# Patient Record
Sex: FEMALE | Race: WHITE | NOT HISPANIC OR LATINO | URBAN - METROPOLITAN AREA
[De-identification: names, ages, dates, MRNs, and addresses within clinical notes are randomized per-mention and may not be internally consistent; named-entity substitution may affect disease eponyms.]

---

## 2017-08-17 ENCOUNTER — EMERGENCY (EMERGENCY)
Facility: HOSPITAL | Age: 22
LOS: 1 days | Discharge: HOME | End: 2017-08-17
Attending: EMERGENCY MEDICINE | Admitting: EMERGENCY MEDICINE
Payer: COMMERCIAL

## 2017-08-17 VITALS
SYSTOLIC BLOOD PRESSURE: 135 MMHG | RESPIRATION RATE: 18 BRPM | HEART RATE: 77 BPM | OXYGEN SATURATION: 100 % | TEMPERATURE: 99 F | DIASTOLIC BLOOD PRESSURE: 63 MMHG

## 2017-08-17 VITALS
DIASTOLIC BLOOD PRESSURE: 66 MMHG | TEMPERATURE: 98 F | HEART RATE: 77 BPM | OXYGEN SATURATION: 97 % | RESPIRATION RATE: 16 BRPM | SYSTOLIC BLOOD PRESSURE: 119 MMHG

## 2017-08-17 PROCEDURE — 99283 EMERGENCY DEPT VISIT LOW MDM: CPT

## 2017-08-17 RX ORDER — IBUPROFEN 200 MG
600 TABLET ORAL ONCE
Qty: 0 | Refills: 0 | Status: COMPLETED | OUTPATIENT
Start: 2017-08-17 | End: 2017-08-17

## 2017-08-17 RX ORDER — IBUPROFEN 200 MG
1 TABLET ORAL
Qty: 12 | Refills: 0 | OUTPATIENT
Start: 2017-08-17 | End: 2017-08-20

## 2017-08-17 RX ORDER — ONDANSETRON 8 MG/1
4 TABLET, FILM COATED ORAL ONCE
Qty: 0 | Refills: 0 | Status: COMPLETED | OUTPATIENT
Start: 2017-08-17 | End: 2017-08-17

## 2017-08-17 RX ADMIN — Medication 600 MILLIGRAM(S): at 20:00

## 2017-08-17 RX ADMIN — ONDANSETRON 4 MILLIGRAM(S): 8 TABLET, FILM COATED ORAL at 20:01

## 2017-08-17 RX ADMIN — Medication 600 MILLIGRAM(S): at 21:04
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
## 2017-08-17 NOTE — ED PROVIDER NOTE - PHYSICAL EXAMINATION
Gen: AAOx3, non-toxic  Head: NCAT  HEENT: EOMI, oral mucosa moist, normal conjunctiva  Lung: CTAB, no respiratory distress, no wheezes/rhonchi/rales B/L, speaking in full sentences  CV: RRR, no murmurs, rubs or gallops. +CW tenderness  Abd: soft, NTND, no guarding  MSK: no visible deformities, full ROM in upper and lower extremities, paraspinal tenderness at thoracic level  Neuro: No focal sensory or motor deficits, CN II-XII intact, +5/5 upper and lower extremity strength  Skin: Warm, well perfused, no rash  Psych: anxious  ~Lali Morrison M.D. Resident

## 2017-08-17 NOTE — ED ADULT NURSE REASSESSMENT NOTE - NS ED NURSE REASSESS COMMENT FT1
patient reports pain relief, denies nausea, is sitting up in stretcher and reading.  Will continue to monitor.

## 2017-08-17 NOTE — ED PROVIDER NOTE - PROGRESS NOTE DETAILS
Pt condition improved, comfortable following pain medication, OK for discharge home with follow up   Lali Morrison M.D. Resident

## 2017-08-17 NOTE — ED PROVIDER NOTE - NS ED ROS FT
EYES: no change in vision, HEENT: no trouble swallowing or speaking, CARDIAC: reproducible CP, PULMONARY: no cough or SOB, GI: no abdominal pain, +nausea, NEURO: no headache, no weakness  MSK: No joint pain otherwise, ~Lali Morrison M.D. Resident
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
## 2025-04-21 NOTE — ED ADULT NURSE NOTE - HARM RISK FACTORS
Nebulizer Treatment:  Treatment vitals and times recorded in vitals section  Patient tolerated the procedure well.    no